# Patient Record
Sex: FEMALE | Race: WHITE | Employment: PART TIME | URBAN - NONMETROPOLITAN AREA
[De-identification: names, ages, dates, MRNs, and addresses within clinical notes are randomized per-mention and may not be internally consistent; named-entity substitution may affect disease eponyms.]

---

## 2022-02-15 ENCOUNTER — OFFICE VISIT (OUTPATIENT)
Dept: FAMILY MEDICINE CLINIC | Age: 27
End: 2022-02-15
Payer: MEDICARE

## 2022-02-15 VITALS
DIASTOLIC BLOOD PRESSURE: 82 MMHG | TEMPERATURE: 98.1 F | HEART RATE: 87 BPM | BODY MASS INDEX: 22.71 KG/M2 | SYSTOLIC BLOOD PRESSURE: 110 MMHG | HEIGHT: 64 IN | WEIGHT: 133 LBS | OXYGEN SATURATION: 99 %

## 2022-02-15 DIAGNOSIS — Z00.01 ENCOUNTER FOR WELL ADULT EXAM WITH ABNORMAL FINDINGS: Primary | ICD-10-CM

## 2022-02-15 DIAGNOSIS — B37.2 CANDIDA ONYCHOMYCOSIS: ICD-10-CM

## 2022-02-15 PROCEDURE — 99385 PREV VISIT NEW AGE 18-39: CPT | Performed by: NURSE PRACTITIONER

## 2022-02-15 RX ORDER — PRENATAL VIT 91/IRON/FOLIC/DHA 28-975-200
COMBINATION PACKAGE (EA) ORAL
Qty: 36 G | Refills: 2 | Status: SHIPPED | OUTPATIENT
Start: 2022-02-15

## 2022-02-15 SDOH — ECONOMIC STABILITY: FOOD INSECURITY: WITHIN THE PAST 12 MONTHS, YOU WORRIED THAT YOUR FOOD WOULD RUN OUT BEFORE YOU GOT MONEY TO BUY MORE.: NEVER TRUE

## 2022-02-15 SDOH — ECONOMIC STABILITY: FOOD INSECURITY: WITHIN THE PAST 12 MONTHS, THE FOOD YOU BOUGHT JUST DIDN'T LAST AND YOU DIDN'T HAVE MONEY TO GET MORE.: NEVER TRUE

## 2022-02-15 ASSESSMENT — PATIENT HEALTH QUESTIONNAIRE - PHQ9
SUM OF ALL RESPONSES TO PHQ QUESTIONS 1-9: 0
SUM OF ALL RESPONSES TO PHQ9 QUESTIONS 1 & 2: 0
2. FEELING DOWN, DEPRESSED OR HOPELESS: 0
1. LITTLE INTEREST OR PLEASURE IN DOING THINGS: 0

## 2022-02-15 ASSESSMENT — ENCOUNTER SYMPTOMS
EYE PAIN: 0
CONSTIPATION: 0
DIARRHEA: 0
ABDOMINAL DISTENTION: 0
COUGH: 0
ABDOMINAL PAIN: 0
CHEST TIGHTNESS: 0
SHORTNESS OF BREATH: 0
EYE REDNESS: 0
WHEEZING: 0

## 2022-02-15 ASSESSMENT — SOCIAL DETERMINANTS OF HEALTH (SDOH): HOW HARD IS IT FOR YOU TO PAY FOR THE VERY BASICS LIKE FOOD, HOUSING, MEDICAL CARE, AND HEATING?: NOT HARD AT ALL

## 2022-02-15 NOTE — PROGRESS NOTES
[address]  Dept: [address] [facility] Pioneer Galo (:  1995) is a 32 y.o. female,New patient, here for evaluation of the following chief complaint(s):  New Patient (Establish Care) and Nail Problem (started 2-3 months ago)      ASSESSMENT/PLAN:  I have reviewed the patient's medical history in detail and updated the computerized patient record. HPI/ROS per the patient and caregiver. Overall non toxic in appearance. Answers questions appropriately. Conditions discussed and addressed this visit include:     Overall well adult  Recently post partum  Recent left great toe nail infection with more than 50% of the nail removed. Recheck in 4 weeks. The patient is advised to reduce salt in diet and cooking, reduce exposure to stress, improve dietary compliance, use calcium 1 gram daily with Vit D, continue current medications, continue current healthy lifestyle patterns and return for routine annual checkups. 1. Encounter for well adult exam with abnormal findings  2. Candida onychomycosis  -     terbinafine (LAMISIL) 1 % cream; Apply topically 2 times daily. , Disp-36 g, R-2, Normal      Return in about 4 weeks (around 3/15/2022) for Medication evaluation, Results review, Routine follow up. SUBJECTIVE/OBJECTIVE:  HPI   No pcp for some time   Works as a    Has 2 month old child   No issues with pregnancy   Had c section   No hx of fractures or head injuries   Big toe on left foot is peeling   Appetite is good   Sleep is good   Breast feeding   No period as of yet     Review of Systems   Constitutional: Negative for activity change, appetite change, fatigue and unexpected weight change. HENT: Negative for ear discharge, ear pain, nosebleeds and tinnitus. Eyes: Negative for pain and redness. Respiratory: Negative for cough, chest tightness, shortness of breath and wheezing. Cardiovascular: Negative for chest pain. neck supple. No muscular tenderness. Lymphadenopathy:      Cervical: No cervical adenopathy. Skin:     General: Skin is warm and dry. Capillary Refill: Capillary refill takes less than 2 seconds. Coloration: Skin is not pale. Findings: No erythema or rash. Comments: Left great nail infection. Neurological:      General: No focal deficit present. Mental Status: She is alert and oriented to person, place, and time. Mental status is at baseline. Cranial Nerves: No cranial nerve deficit. Motor: No abnormal muscle tone. Coordination: Coordination normal.      Deep Tendon Reflexes: Reflexes are normal and symmetric. Reflexes normal.   Psychiatric:         Behavior: Behavior normal.         Thought Content: Thought content normal.         Judgment: Judgment normal.                 An electronic signature was used to authenticate this note.     --Nate Sep, APRN - CNP

## 2022-02-15 NOTE — PATIENT INSTRUCTIONS
Patient Education        Well Visit, Ages 25 to 48: Care Instructions  Overview     Well visits can help you stay healthy. Your doctor has checked your overall health and may have suggested ways to take good care of yourself. Your doctor also may have recommended tests. At home, you can help prevent illness with healthy eating, regular exercise, and other steps. Follow-up care is a key part of your treatment and safety. Be sure to make and go to all appointments, and call your doctor if you are having problems. It's also a good idea to know your test results and keep a list of the medicines you take. How can you care for yourself at home? · Get screening tests that you and your doctor decide on. Screening helps find diseases before any symptoms appear. · Eat healthy foods. Choose fruits, vegetables, whole grains, protein, and low-fat dairy foods. Limit fat, especially saturated fat. Reduce salt in your diet. · Limit alcohol. If you are a man, have no more than 2 drinks a day or 14 drinks a week. If you are a woman, have no more than 1 drink a day or 7 drinks a week. · Get at least 30 minutes of physical activity on most days of the week. Walking is a good choice. You also may want to do other activities, such as running, swimming, cycling, or playing tennis or team sports. Discuss any changes in your exercise program with your doctor. · Reach and stay at a healthy weight. This will lower your risk for many problems, such as obesity, diabetes, heart disease, and high blood pressure. · Do not smoke or allow others to smoke around you. If you need help quitting, talk to your doctor about stop-smoking programs and medicines. These can increase your chances of quitting for good. · Care for your mental health. It is easy to get weighed down by worry and stress. Learn strategies to manage stress, like deep breathing and mindfulness, and stay connected with your family and community.  If you find you often feel sad or hopeless, talk with your doctor. Treatment can help. · Talk to your doctor about whether you have any risk factors for sexually transmitted infections (STIs). You can help prevent STIs if you wait to have sex with a new partner (or partners) until you've each been tested for STIs. It also helps if you use condoms (male or female condoms) and if you limit your sex partners to one person who only has sex with you. Vaccines are available for some STIs, such as HPV. · Use birth control if it's important to you to prevent pregnancy. Talk with your doctor about the choices available and what might be best for you. · If you think you may have a problem with alcohol or drug use, talk to your doctor. This includes prescription medicines (such as amphetamines and opioids) and illegal drugs (such as cocaine and methamphetamine). Your doctor can help you figure out what type of treatment is best for you. · Protect your skin from too much sun. When you're outdoors from 10 a.m. to 4 p.m., stay in the shade or cover up with clothing and a hat with a wide brim. Wear sunglasses that block UV rays. Even when it's cloudy, put broad-spectrum sunscreen (SPF 30 or higher) on any exposed skin. · See a dentist one or two times a year for checkups and to have your teeth cleaned. · Wear a seat belt in the car. When should you call for help? Watch closely for changes in your health, and be sure to contact your doctor if you have any problems or symptoms that concern you. Where can you learn more? Go to https://Granite Investment Groupasael.healthRedux Technologiespartners. org and sign in to your "[employer] account. Enter P072 in the Antuit box to learn more about \"Well Visit, Ages 25 to 48: Care Instructions. \"     If you do not have an account, please click on the \"Sign Up Now\" link. Current as of: October 6, 2021               Content Version: 13.1  © 6537-8782 Healthwise, Incorporated.    Care instructions adapted under license by [facility]. If you have questions about a medical condition or this instruction, always ask your healthcare professional. [address] any warranty or liability for your use of this information. Patient Education        Toenail Fungus: Care Instructions  Overview  A nail that is infected by a fungus usually turns white or yellow. As the fungus spreads, the nail turns a darker color and gets thicker. And the nail edges start to turn ragged and crumble. A bad infection can cause pain, and the nail may pull away from the toe or finger. Nails that are exposed to moisture and warmth a lot are more likely to get infected by a fungus. This can happen from wearing sweaty shoes often and from walking barefoot on shower floors. Or it can happen if you share personal things, such as towels and nail clippers. It's hard to treat nail fungus. And the infection can return after it has cleared up. But medicines can sometimes get rid of nail fungus for good. If the infection is very bad, or if it causes a lot of pain, you may need to have the nail removed. Follow-up care is a key part of your treatment and safety. Be sure to make and go to all appointments, and call your doctor if you are having problems. It's also a good idea to know your test results and keep a list of the medicines you take. How can you care for yourself at home? · Take your medicines exactly as prescribed. Call your doctor if you have any problems with your medicine. · If your doctor gave you a cream or liquid to put on your nail, use it exactly as directed. · Wash your hands and feet often, and wash your hands after touching your feet. · Keep your nails clean and dry. Dry your feet completely after you bathe and before you put on shoes and socks. · Keep your nails trimmed. · Change socks often. Wear dry socks that absorb moisture. · Don't go barefoot in public places.   · Use a spray or powder that fights fungus on your feet and in your shoes. · Don't pick at the skin around your nails. · Don't use nail polish or fake nails on your nails. · Don't share personal things, such as towels and nail clippers. When should you call for help? Call your doctor now or seek immediate medical care if:    · You have signs of infection, such as:  ? Increased pain, swelling, warmth, or redness. ? Red streaks leading from the site. ? Pus draining from the site. ? A fever.     · You have new or increased toe pain. Watch closely for changes in your health, and be sure to contact your doctor if:    · You do not get better as expected. Where can you learn more? Go to https://Second WindpeOpenTableeb.Jaspersoft. org and sign in to your Hepregen account. Enter D202 in the Zilyo box to learn more about \"Toenail Fungus: Care Instructions. \"     If you do not have an account, please click on the \"Sign Up Now\" link. Current as of: March 3, 2021               Content Version: 13.1  © 9027-4921 Healthwise, Incorporated. Care instructions adapted under license by [facility] ([facility]). If you have questions about a medical condition or this instruction, always ask your healthcare professional. [address] any warranty or liability for your use of this information.

## 2022-03-15 ENCOUNTER — OFFICE VISIT (OUTPATIENT)
Dept: FAMILY MEDICINE CLINIC | Age: 27
End: 2022-03-15
Payer: MEDICARE

## 2022-03-15 VITALS
DIASTOLIC BLOOD PRESSURE: 82 MMHG | HEART RATE: 92 BPM | SYSTOLIC BLOOD PRESSURE: 110 MMHG | TEMPERATURE: 97.9 F | WEIGHT: 125.2 LBS | OXYGEN SATURATION: 99 % | BODY MASS INDEX: 21.22 KG/M2

## 2022-03-15 DIAGNOSIS — J30.9 ALLERGIC SINUSITIS: ICD-10-CM

## 2022-03-15 DIAGNOSIS — B37.2 CANDIDA ONYCHOMYCOSIS: Primary | ICD-10-CM

## 2022-03-15 PROCEDURE — G8420 CALC BMI NORM PARAMETERS: HCPCS | Performed by: NURSE PRACTITIONER

## 2022-03-15 PROCEDURE — 1036F TOBACCO NON-USER: CPT | Performed by: NURSE PRACTITIONER

## 2022-03-15 PROCEDURE — 99214 OFFICE O/P EST MOD 30 MIN: CPT | Performed by: NURSE PRACTITIONER

## 2022-03-15 PROCEDURE — G8484 FLU IMMUNIZE NO ADMIN: HCPCS | Performed by: NURSE PRACTITIONER

## 2022-03-15 PROCEDURE — G8427 DOCREV CUR MEDS BY ELIG CLIN: HCPCS | Performed by: NURSE PRACTITIONER

## 2022-03-15 RX ORDER — LORATADINE 10 MG/1
10 TABLET ORAL DAILY
Qty: 90 TABLET | Refills: 1 | Status: SHIPPED | OUTPATIENT
Start: 2022-03-15

## 2022-03-15 RX ORDER — METHYLPREDNISOLONE 4 MG/1
TABLET ORAL
Qty: 1 KIT | Refills: 0 | Status: SHIPPED | OUTPATIENT
Start: 2022-03-15 | End: 2022-03-21

## 2022-03-15 RX ORDER — ECHINACEA PURPUREA EXTRACT 125 MG
1 TABLET ORAL PRN
Qty: 1 EACH | Refills: 3 | Status: SHIPPED | OUTPATIENT
Start: 2022-03-15

## 2022-03-15 RX ORDER — CEFDINIR 300 MG/1
300 CAPSULE ORAL 2 TIMES DAILY
Qty: 14 CAPSULE | Refills: 0 | Status: SHIPPED | OUTPATIENT
Start: 2022-03-15 | End: 2022-03-22

## 2022-03-15 ASSESSMENT — ENCOUNTER SYMPTOMS
SINUS PRESSURE: 1
WHEEZING: 0
RHINORRHEA: 1
EYE REDNESS: 0
EYE PAIN: 0
SINUS PAIN: 1
CHEST TIGHTNESS: 0
SHORTNESS OF BREATH: 0
COUGH: 0
GASTROINTESTINAL NEGATIVE: 1

## 2022-03-15 NOTE — PATIENT INSTRUCTIONS
Patient Education        Seasonal Allergies: Care Instructions  Your Care Instructions     Allergies occur when your body's defense system (immune system) overreacts to certain substances. The immune system treats a harmless substance as if it were a harmful germ or virus. Many things can cause this to happen. Examples include pollens, medicine, food, dust, animal dander, and mold. Your allergies are seasonal if you have symptoms just at certain times of the year. In that case, you are probably allergic to pollens from certain trees, grasses, or weeds. Allergies can be mild or severe. Over-the-counter allergy medicine may help with some symptoms. Read and follow all instructions on the label. Managing your allergies is an important part of staying healthy. Your doctor may suggest that you have tests to help find the cause of your allergies. When you know what things trigger your symptoms, you can avoid them. This can prevent allergy symptoms and other health problems. In some cases, immunotherapy might help. For this treatment, you get shots or use pills that have a small amount of certain allergens in them. Your body \"gets used to\" the allergen, so you react less to it over time. This kind of treatment may help prevent or reduce some allergy symptoms. Follow-up care is a key part of your treatment and safety. Be sure to make and go to all appointments, and call your doctor if you are having problems. It's also a good idea to know your test results and keep a list of the medicines you take. How can you care for yourself at home? · Be safe with medicines. Take your medicines exactly as prescribed. Call your doctor if you think you are having a problem with your medicine. · During your allergy season, keep windows closed. If you need to use air-conditioning, change or clean all filters every month. Take a shower and change your clothes after you have been outside. · Stay inside when pollen counts are high. Vacuum once or twice a week. Use a vacuum  with a HEPA filter or a double-thickness filter. When should you call for help? Give an epinephrine shot if:    · You think you are having a severe allergic reaction. After giving an epinephrine shot, call 911, even if you feel better. Call 911 if:    · You have symptoms of a severe allergic reaction. These may include:  ? Sudden raised, red areas (hives) all over your body. ? Swelling of the throat, mouth, lips, or tongue. ? Trouble breathing. ? Passing out (losing consciousness). Or you may feel very lightheaded or suddenly feel weak, confused, or restless.     · You have been given an epinephrine shot, even if you feel better. Call your doctor now or seek immediate medical care if:    · You have symptoms of an allergic reaction, such as:  ? A rash or hives (raised, red areas on the skin). ? Itching. ? Swelling. ? Belly pain, nausea, or vomiting. Watch closely for changes in your health, and be sure to contact your doctor if:    · You do not get better as expected. Where can you learn more? Go to https://EnswerspeMarketInvoice.Accelergy. org and sign in to your Thinking Screen Media account. Enter J912 in the [facility] box to learn more about \"Seasonal Allergies: Care Instructions. \"     If you do not have an account, please click on the \"Sign Up Now\" link. Current as of: February 10, 2021               Content Version: 13.1  © 2006-2021 [employer]. Care instructions adapted under license by [facility] ([facility]). If you have questions about a medical condition or this instruction, always ask your healthcare professional. [place] [address] any warranty or liability for your use of this information. Patient Education        Managing Your Allergies: Care Instructions  Your Care Instructions     Managing your allergies is an important part of staying healthy.  Your doctor will help you find out what may be the cause of the allergies. Common causes of symptoms are house dust and dust mites, animal dander, mold, and pollen. As soon as you know what triggers your symptoms, try to avoid those things. This can help prevent allergy symptoms, asthma, and other health problems. Ask your doctor about allergy medicine or immunotherapy. These treatments may help reduce or prevent allergy symptoms. Follow-up care is a key part of your treatment and safety. Be sure to make and go to all appointments, and call your doctor if you are having problems. It's also a good idea to know your test results and keep a list of the medicines you take. How can you care for yourself at home? · If you have been told by your doctor that dust or dust mites are causing your allergy, decrease the dust around your bed:  ? Wash sheets, pillowcases, and other bedding in hot water every week. ? Use dust-proof covers for pillows, duvets, and mattresses. Avoid plastic covers because they tear easily and do not \"breathe. \" Wash as instructed on the label. ? Do not use any blankets and pillows that you do not need. ? Use blankets that you can wash in your washing machine. ? Consider removing drapes and carpets, which attract and hold dust, from your bedroom. · If you are allergic to house dust and mites, do not use home humidifiers. Your doctor can suggest ways you can control dust and mites. · Look for signs of cockroaches. Cockroaches cause allergic reactions. Use cockroach baits to get rid of them. Then, clean your home well. Cockroaches like areas where grocery bags, newspapers, empty bottles, or cardboard boxes are stored. Do not keep these inside your home, and keep trash and food containers sealed. Seal off any spots where cockroaches might enter your home. · If you are allergic to mold, get rid of furniture, rugs, and drapes that smell musty. Check for mold in the bathroom. · If you are allergic to outdoor pollen or mold spores, use air-conditioning. Change or clean all filters every month. Keep windows closed. · If you are allergic to pollen, stay inside when pollen counts are high. Use a vacuum  with a HEPA filter or a double-thickness filter at least two times each week. · Stay inside when air pollution is bad. Avoid paint fumes, perfumes, and other strong odors. · Avoid conditions that make your allergies worse. Stay away from smoke. Do not smoke or let anyone else smoke in your house. Do not use fireplaces or wood-burning stoves. · If you are allergic to your pets, change the air filter in your furnace every month. Use high-efficiency filters. · If you are allergic to pet dander, keep pets outside or out of your bedroom. Old carpet and cloth furniture can hold a lot of animal dander. You may need to replace them. When should you call for help? Give an epinephrine shot if:    · You think you are having a severe allergic reaction. After giving an epinephrine shot call 911, even if you feel better. Call 911 if:    · You have symptoms of a severe allergic reaction. These may include:  ? Sudden raised, red areas (hives) all over your body. ? Swelling of the throat, mouth, lips, or tongue. ? Trouble breathing. ? Passing out (losing consciousness). Or you may feel very lightheaded or suddenly feel weak, confused, or restless.     · You have been given an epinephrine shot, even if you feel better. Call your doctor now or seek immediate medical care if:    · You have symptoms of an allergic reaction, such as:  ? A rash or hives (raised, red areas on the skin). ? Itching. ? Swelling. ? Belly pain, nausea, or vomiting. Watch closely for changes in your health, and be sure to contact your doctor if:    · Your allergies get worse.     · You need help controlling your allergies.     · You have questions about allergy testing.     · You do not get better as expected. Where can you learn more? Go to https://chpenadeemeweb.health-partners. org and sign in to your ECO-SAFE account. Enter L249 in the [email or web address].[email or web address] box to learn more about \"Managing Your Allergies: Care Instructions. \"     If you do not have an account, please click on the \"Sign Up Now\" link. Current as of: February 10, 2021               Content Version: 13.1  © 4127-3879 Healthwise, Incorporated. Care instructions adapted under license by [facility] ([facility]). If you have questions about a medical condition or this instruction, always ask your healthcare professional. [address] any warranty or liability for your use of this information.

## 2022-03-15 NOTE — PROGRESS NOTES
[address]  Dept: [address] [facility] Pioneer Galo (:  1995) is a 32 y.o. female,Established patient, here for evaluation of the following chief complaint(s):  1 Month Follow-Up (toe issue)      ASSESSMENT/PLAN:  I have reviewed the patient's medical history in detail and updated the computerized patient record. HPI/ROS per the patient and caregiver. Overall non toxic in appearance. Answers questions appropriately. Conditions discussed and addressed this visit include:     Referral out to podiatry for further assessment of fungus of nail bed. No improvement with the lamisil. Treat today for allergy induced sinus infection. Drink lots of fluids  Take Motrin or Tylenol for headache  Humidification of air  Use nasal spray as directed  Take a nasal decongestant as directed  Monitor for any fever increased and sinus pain or pressure      1. Candida onychomycosis  -     External Referral To Podiatry  2. Allergic sinusitis  -     sodium chloride (ALTAMIST SPRAY) 0.65 % nasal spray; 1 spray by Nasal route as needed for Congestion, Disp-1 each, R-3Normal  -     cefdinir (OMNICEF) 300 MG capsule; Take 1 capsule by mouth 2 times daily for 7 days, Disp-14 capsule, R-0Normal  -     methylPREDNISolone (MEDROL, RUI,) 4 MG tablet; Take by mouth., Disp-1 kit, R-0Normal  -     loratadine (CLARITIN) 10 MG tablet; Take 1 tablet by mouth daily, Disp-90 tablet, R-1Normal      Return if symptoms worsen or fail to improve, for Results review, Routine follow up. SUBJECTIVE/OBJECTIVE:  HPI   Here for toe issue.  Nail is growing some, nail edge is still ragged.  Not much improvement in the nail bed   Allergy symptoms x 2 weeks   Headache, bilateral   No fever   Bloody nasal drainage     Review of Systems   Constitutional: Negative for activity change and appetite change. HENT: Positive for nosebleeds, postnasal drip, rhinorrhea, sinus pressure and sinus pain. Eyes: Negative for pain and redness. Respiratory: Negative for cough, chest tightness, shortness of breath and wheezing. Cardiovascular: Negative for leg swelling. Gastrointestinal: Negative. Endocrine: Negative. Genitourinary: Negative. Musculoskeletal: Negative. Skin: Negative. Allergic/Immunologic: Positive for environmental allergies. Neurological: Negative. Hematological: Negative for adenopathy. Does not bruise/bleed easily. Psychiatric/Behavioral: Negative. Physical Exam  Vitals and nursing note reviewed. Constitutional:       Appearance: Normal appearance. She is well-developed and normal weight. HENT:      Head: Normocephalic and atraumatic. Right Ear: External ear normal. Tympanic membrane is injected. Left Ear: External ear normal. Tympanic membrane is injected, erythematous and bulging. Nose: Mucosal edema and rhinorrhea present. Rhinorrhea is purulent and bloody. Right Sinus: Maxillary sinus tenderness present. Left Sinus: Maxillary sinus tenderness present. Mouth/Throat:      Lips: Pink. Mouth: Mucous membranes are moist.      Pharynx: Posterior oropharyngeal erythema present. No oropharyngeal exudate. Eyes:      General:         Right eye: No discharge. Left eye: No discharge. Conjunctiva/sclera: Conjunctivae normal.   Neck:      Thyroid: No thyromegaly. Cardiovascular:      Rate and Rhythm: Normal rate and regular rhythm. Pulses: Normal pulses. Heart sounds: Normal heart sounds. Pulmonary:      Effort: Pulmonary effort is normal. No respiratory distress. Breath sounds: Normal breath sounds. No wheezing or rales. Chest:      Chest wall: No tenderness. Abdominal:      General: Bowel sounds are normal. There is no distension. Palpations: Abdomen is soft. Tenderness: There is no abdominal tenderness. Musculoskeletal:         General: No tenderness. Normal range of motion. Cervical back: Normal range of motion and neck supple. No muscular tenderness. Lymphadenopathy:      Cervical: No cervical adenopathy. Skin:     General: Skin is warm and dry. Capillary Refill: Capillary refill takes less than 2 seconds. Coloration: Skin is not pale. Findings: No erythema or rash. Comments: Left great nail infection. Neurological:      General: No focal deficit present. Mental Status: She is alert and oriented to person, place, and time. Mental status is at baseline. Cranial Nerves: No cranial nerve deficit. Motor: No abnormal muscle tone. Coordination: Coordination normal.      Deep Tendon Reflexes: Reflexes are normal and symmetric. Reflexes normal.   Psychiatric:         Behavior: Behavior normal.         Thought Content: Thought content normal.         Judgment: Judgment normal.                 An electronic signature was used to authenticate this note.     --EDYTA Talavera - CNP